# Patient Record
Sex: MALE | Race: BLACK OR AFRICAN AMERICAN
[De-identification: names, ages, dates, MRNs, and addresses within clinical notes are randomized per-mention and may not be internally consistent; named-entity substitution may affect disease eponyms.]

---

## 2020-08-01 ENCOUNTER — HOSPITAL ENCOUNTER (EMERGENCY)
Dept: HOSPITAL 62 - ER | Age: 25
Discharge: HOME | End: 2020-08-01
Payer: OTHER GOVERNMENT

## 2020-08-01 VITALS — SYSTOLIC BLOOD PRESSURE: 141 MMHG | DIASTOLIC BLOOD PRESSURE: 81 MMHG

## 2020-08-01 DIAGNOSIS — R11.0: ICD-10-CM

## 2020-08-01 DIAGNOSIS — N30.91: Primary | ICD-10-CM

## 2020-08-01 DIAGNOSIS — Z87.442: ICD-10-CM

## 2020-08-01 DIAGNOSIS — M54.9: ICD-10-CM

## 2020-08-01 DIAGNOSIS — R10.9: ICD-10-CM

## 2020-08-01 LAB
ADD MANUAL DIFF: NO
ALBUMIN SERPL-MCNC: 4.7 G/DL (ref 3.5–5)
ALP SERPL-CCNC: 55 U/L (ref 38–126)
ANION GAP SERPL CALC-SCNC: 6 MMOL/L (ref 5–19)
APPEARANCE UR: (no result)
APTT PPP: YELLOW S
AST SERPL-CCNC: 30 U/L (ref 17–59)
BASOPHILS # BLD AUTO: 0.1 10^3/UL (ref 0–0.2)
BASOPHILS NFR BLD AUTO: 1.4 % (ref 0–2)
BILIRUB DIRECT SERPL-MCNC: 0.1 MG/DL (ref 0–0.4)
BILIRUB SERPL-MCNC: 0.5 MG/DL (ref 0.2–1.3)
BILIRUB UR QL STRIP: NEGATIVE
BUN SERPL-MCNC: 13 MG/DL (ref 7–20)
CALCIUM: 9.9 MG/DL (ref 8.4–10.2)
CHLORIDE SERPL-SCNC: 107 MMOL/L (ref 98–107)
CO2 SERPL-SCNC: 27 MMOL/L (ref 22–30)
EOSINOPHIL # BLD AUTO: 0.3 10^3/UL (ref 0–0.6)
EOSINOPHIL NFR BLD AUTO: 4.9 % (ref 0–6)
ERYTHROCYTE [DISTWIDTH] IN BLOOD BY AUTOMATED COUNT: 13.5 % (ref 11.5–14)
GLUCOSE SERPL-MCNC: 89 MG/DL (ref 75–110)
GLUCOSE UR STRIP-MCNC: NEGATIVE MG/DL
HCT VFR BLD CALC: 41.5 % (ref 37.9–51)
HGB BLD-MCNC: 14 G/DL (ref 13.5–17)
KETONES UR STRIP-MCNC: NEGATIVE MG/DL
LYMPHOCYTES # BLD AUTO: 3.1 10^3/UL (ref 0.5–4.7)
LYMPHOCYTES NFR BLD AUTO: 45.4 % (ref 13–45)
MCH RBC QN AUTO: 29.3 PG (ref 27–33.4)
MCHC RBC AUTO-ENTMCNC: 33.7 G/DL (ref 32–36)
MCV RBC AUTO: 87 FL (ref 80–97)
MONOCYTES # BLD AUTO: 0.5 10^3/UL (ref 0.1–1.4)
MONOCYTES NFR BLD AUTO: 6.9 % (ref 3–13)
NEUTROPHILS # BLD AUTO: 2.8 10^3/UL (ref 1.7–8.2)
NEUTS SEG NFR BLD AUTO: 41.4 % (ref 42–78)
NITRITE UR QL STRIP: NEGATIVE
PH UR STRIP: 5 [PH] (ref 5–9)
PLATELET # BLD: 287 10^3/UL (ref 150–450)
POTASSIUM SERPL-SCNC: 4.5 MMOL/L (ref 3.6–5)
PROT SERPL-MCNC: 8.2 G/DL (ref 6.3–8.2)
PROT UR STRIP-MCNC: NEGATIVE MG/DL
RBC # BLD AUTO: 4.77 10^6/UL (ref 4.35–5.55)
SP GR UR STRIP: 1.01
TOTAL CELLS COUNTED % (AUTO): 100 %
UROBILINOGEN UR-MCNC: NEGATIVE MG/DL (ref ?–2)
WBC # BLD AUTO: 6.9 10^3/UL (ref 4–10.5)

## 2020-08-01 PROCEDURE — 85025 COMPLETE CBC W/AUTO DIFF WBC: CPT

## 2020-08-01 PROCEDURE — 96372 THER/PROPH/DIAG INJ SC/IM: CPT

## 2020-08-01 PROCEDURE — 36415 COLL VENOUS BLD VENIPUNCTURE: CPT

## 2020-08-01 PROCEDURE — 83690 ASSAY OF LIPASE: CPT

## 2020-08-01 PROCEDURE — 74176 CT ABD & PELVIS W/O CONTRAST: CPT

## 2020-08-01 PROCEDURE — 80053 COMPREHEN METABOLIC PANEL: CPT

## 2020-08-01 PROCEDURE — 81001 URINALYSIS AUTO W/SCOPE: CPT

## 2020-08-01 PROCEDURE — 99284 EMERGENCY DEPT VISIT MOD MDM: CPT

## 2020-08-01 NOTE — ER DOCUMENT REPORT
ED GI/





- General


Chief Complaint: Flank Pain


Stated Complaint: FLANK PAIN


Time Seen by Provider: 20 16:49


Primary Care Provider: 


LARRY GALVAN [NO LOCAL MD] - Follow up as needed


Notes: 





24-year-old male presents to the emergency department with a history of right 

flank pain.  He states that he has had these symptoms intermittently for the 

past 2 weeks.  Patient states that he was seen at [facility] 1 week ago and x-

ray was done and because of blood in his urine and the pain in his right side 

and back he was told he had a kidney stone.  He is continued to have dysuria, 

blood in his urine and intermittent right-sided back pain.  At times the pain is

severe and throbbing he also notes that he has had some associated nausea.  

Presently he is doing well and states that he does not need anything for pain.





- Related Data


Allergies/Adverse Reactions: 


                                        





No Known Allergies Allergy (Verified 20 16:49)


   











Past Medical History





- Social History


Smoking Status: Never Smoker


Family History: Reviewed & Not Pertinent


Patient has homicidal ideation: No


Psychiatric Medical History: Reports: Hx Anxiety, Hx Depression





- Immunizations


Immunizations up to date: Yes


Hx Diphtheria, Pertussis, Tetanus Vaccination: Yes





Review of Systems





- Review of Systems


Notes: 





Constitutional: Negative for fever.


HENT: Negative for sore throat.


Eyes: Negative for visual changes.


Cardiovascular: Negative for chest pain.


Respiratory: Negative for shortness of breath.


Gastrointestinal: Negative for abdominal pain, vomiting or diarrhea.


Genitourinary: + Hematuria, + Dysuria 


Musculoskeletal: + Right flank pain, + back pain


Skin: Negative for rash.


Neurological: Negative for headaches, weakness or numbness.





10 point ROS negative except as marked above and in HPI.





Physical Exam





- Vital signs


Vitals: 


                                        











Temp Pulse Resp BP Pulse Ox


 


 98.9 F   71   18   141/81 H  98 


 


 20 15:19  20 15:19  20 15:19  20 15:19  20 15:19














- Notes


Notes: 





PHYSICAL EXAMINATION:


 


Physical Exam:


General: Well-nourished well-developed 24-year-old male in no acute distress


HEENT: NC/AT, pupils equal round and reactive to light, MM moist,nares clear, 

oropharynx clear, airway patent


Neck: supple, no adenopathy, no masses.  Good range of motion


Lungs: clear, no wheezing, no rales no rhonchi


CVS: Regular rate and rhythm no murmur gallop or rub


Abdomen: Soft, active, nontender, no masses, no hepatosplenomegaly


Ext:   + Tenderness in the right paraspinous muscle group, decreased range of 

motion in the back secondary to tenderness.


Neuro: Alert and responsive, moving all 4 extremities on command, cranial nerves

intact, no focal findings


Skin: Intact no open lesions, no rash


PSYCH: Normal mood, normal affect.


 








Course





- Re-evaluation


Re-evalutation: 





20 19:32


Patient is comfortable at this time, denies severe back pain, no nausea and no 

fever.  Review of his labs reveals a normal white count urine reveals greater 

than 182 WBCs with large leukocyte esterase.  I have explained to the patient he

likely has a urinary tract infection with hematuria dysuria.  Hemorrhagic 

cystitis is usually amenable to antibiotic treatment.  He is given Rocephin 1 g 

IM, a prescription for Keflex and ibuprofen.  I encouraged him to continue push 

fluids, increasing his fluid intake and to follow-up with his primary care do

ctor as needed.  Patient is in agreement with this plan.











- Vital Signs


Vital signs: 


                                        











Temp Pulse Resp BP Pulse Ox


 


 98.9 F   71   18   141/81 H  98 


 


 20 15:19  20 15:19  20 15:19  20 15:19  20 15:19














- Laboratory


Result Diagrams: 


                                 20 17:12





                                 20 17:12


Laboratory results interpreted by me: 


                                        











  20





  17:12 17:12 17:12


 


Lymph % (Auto)  45.4 H  


 


Seg Neutrophils %  41.4 L  


 


ALT   52 H 


 


Urine Blood    SMALL H


 


Ur Leukocyte Esterase    LARGE H











20 19:34


I have reviewed laboratory data and used this information for the treatment 

decisions regarding the patient.





- Diagnostic Test


Radiology reviewed: Image reviewed, Reports reviewed


Radiology results interpreted by me: 





20 19:37


CT abdomen and pelvis without contrast: No hydronephrosis or hydroureter, no 

kidney stone found.  Normal study.











Discharge





- Discharge


Clinical Impression: 


 Hemorrhagic cystitis, Back pain





Condition: Good


Disposition: HOME, SELF-CARE


Instructions:  Urinary Tract Infection ([facility])


Additional Instructions: 


You were seen in the emergency department today with concerns of a kidney stone.

 The CT scan performed today does not reveal a kidney stone or residual findings

suggestive of a passed kidney stone.  The urine however is infected and shows 

signs of possible hemorrhagic cystitis.  You are being given a shot of 

antibiotics in emergency department and a prescription to get field for 

antibiotics and ibuprofen.  If your symptoms are worsening, if you run fever, or

if you feel like you are getting worse you may return to the emergency 

department for further evaluation and treatment.








HOME CARE INSTRUCTIONS & INFORMATION:  Thank you for choosing us for your 

medical needs. We hope you're satisfied with the care you received.  After you 

leave, you must properly care for your problem and, at the same time, observe 

its progress.  Any condition can change.  Some illnesses can change rapidly over

hours or days.  If your condition worsens, return to the Emergency Department or

see your physician promptly.





ABOUT YOUR X-RAYS AND EKG'S:   If you had an EKG or X-rays taken, they have been

read by the Emergency Physician. The X-rays and EKG's will also be read by a 

Radiologist or Cardiologist within 24 hours.  If discrepancies are noted, you 

will be notified by telephone.  Please be certain the ED has a correct telephone

number & address where you can be reached.  Also, realize that some fractures or

abnormalities do not show up on initial X-rays.  If your symptoms continue, see 

your physician.





ABOUT YOUR LABORATORY TEST:   If you had laboratory tests, the results have been

reviewed by the Emergency Physician.  Some test results (for example cultures) 

may not be available for several days.  You will be contacted if any test result

shows you need additional treatment.  Please be certain the ED has a correct 

telephone number and address where you can be reached.





ABOUT YOUR MEDICATIONS:  You will receive instructions on how to take your 

medicine on the prescription label you receive.  Additional information may be 

provided by the Pharmacy.  If you have questions afterwards, call the ED for 

clarification or further instructions.  Some prescribed medications may cause 

drowsiness.  Do not perform tasks such as driving a car or operating machinery 

without consulting your Pharmacist.  If you feel you need a refill of pain 

medication, your condition will need re-evaluation.  Please do not call for a 

refill of any medication.





ABOUT YOUR SIGNATURE:   Signature of this document acknowledges to followin. Understanding that you received emergency treatment and that you may be 

released before al medical problems are known or treated. Please be certain   

the ED has a correct phone number & address where you can be reached.


   2. Acknowledgement that you will arrange for follow-up care as recommended.


   3. Authorization for the Emergency Physician to provide information to your 

follow-up Physician in order to maximize your care.





AT ANY TIME, IF YOUR SYMPTOMS CHANGE SIGNIFICANTLY OR WORSEN OR YOU DEVELOP NEW 

SYMPTOMS, RETURN TO THE EMERGENCY DEPARTMENT IMMEDIATELY FOR RE-EVALUATION.





OUR GOAL IS TO PROVIDE EXCELLENT MEDICAL CARE!





WE HOPE THAT WE HAVE MET YOUR EXPECTATIONS DURING YOUR EMERGENCY DEPARTMENT 

VISIT AND THAT YOU FEEL YOU HAVE RECEIVED EXCELLENT CARE!











Prescriptions: 


Cephalexin Monohydrate [Keflex 500 mg Capsule] 500 mg PO Q8 10 Days #30 capsule


Ibuprofen [Motrin 800 mg Tablet] 800 mg PO Q8H PRN #30 tab


 PRN Reason: 


Referrals: 


[facility] [NO LOCAL MD] - Follow up as needed

## 2020-08-01 NOTE — ER DOCUMENT REPORT
ED Medical Screen (RME)





- General


Stated Complaint: FLANK PAIN


Time Seen by Provider: 08/01/20 16:49


Primary Care Provider: 


LARRY GALVAN [Primary Care Provider] - Follow up as needed


Notes: 





HPI: 24-year-old male with no history of kidney stones presenting for 

intermittent right flank pain with hematuria for a week.  Went to [facility] 1

week ago and based on a urinalysis they determined he might have a kidney stone.

 Placed him on Flomax and hydrocodone.  Patient states he is continued with the 

pain follow back up with him today and they told him to come to the emergency 

department because he might need CT.  Patient denies fever nausea vomiting.





PHYSICAL EXAMINATION: No CVA tenderness or tenderness through the right upper or

lower quadrants on palpation.   exam deferred in triage











I have greeted and performed a rapid initial assessment of this patient.  A 

comprehensive ED assessment and evaluation of the patient, analysis of test 

results and completion of medical decision making process will be conducted by 

an additional ED providers.





- Related Data


Allergies/Adverse Reactions: 


                                        





No Known Allergies Allergy (Verified 08/01/20 16:49)


   











Past Medical History


Psychiatric Medical History: Reports: Hx Anxiety, Hx Depression





- Immunizations


Immunizations up to date: Yes


Hx Diphtheria, Pertussis, Tetanus Vaccination: Yes





Physical Exam





- Vital signs


Vitals: 





                                        











Temp Pulse Resp BP Pulse Ox


 


 98.9 F   71   18   141/81 H  98 


 


 08/01/20 15:19  08/01/20 15:19  08/01/20 15:19  08/01/20 15:19  08/01/20 15:19














Course





- Vital Signs


Vital signs: 





                                        











Temp Pulse Resp BP Pulse Ox


 


 98.9 F   71   18   141/81 H  98 


 


 08/01/20 15:19  08/01/20 15:19  08/01/20 15:19  08/01/20 15:19  08/01/20 15:19














Doctor's Discharge





- Discharge


Referrals: 


[facility] [Primary Care Provider] - Follow up as needed

## 2020-08-01 NOTE — RADIOLOGY REPORT (SQ)
EXAM DESCRIPTION:  CT ABD/PELVIS NO ORAL OR IV



IMAGES COMPLETED DATE/TIME:  8/1/2020 6:24 pm



REASON FOR STUDY:  right flank pain



COMPARISON:  None.



TECHNIQUE:  CT scan of the abdomen and pelvis performed without intravenous or oral contrast. Images 
reviewed with lung, soft tissue, and bone windows. Reconstructed coronal and sagittal MPR images revi
ewed. All images stored on PACS.

All CT scanners at this facility use dose modulation, iterative reconstruction, and/or weight based d
osing when appropriate to reduce radiation dose to as low as reasonably achievable (ALARA).

[facility]: Dose Right  [facility]: CareDose    [facility]: Dose Right    [facility]: Teradose 4D    [facility]: Smart MATRIXX Software



RADIATION DOSE:  CT Rad equipment meets quality standard of care and radiation dose reduction techniq
ues were employed. CTDIvol: 17.8 mGy. DLP: 1002 mGy-cm.mGy.



LIMITATIONS:  None.



FINDINGS:  LOWER CHEST: No significant findings. No nodules or infiltrates.

NON-CONTRASTED LIVER, SPLEEN, ADRENALS: Evaluation limited by lack of IV contrast. No identified sign
ificant masses.

PANCREAS: No masses. No peripancreatic inflammatory changes.

GALLBLADDER: No calcified stones. No inflammatory changes to suggest cholecystitis.

RIGHT KIDNEY AND URETER: No cysts identified. No solid masses. No calcified stones. No hydronephrosis
 or hydroureter.

LEFT KIDNEY AND URETER: No cysts identified. No solid masses. No calcified stones. No hydronephrosis 
or hydroureter.

AORTA AND RETROPERITONEUM: No aneurysm. No retroperitoneal masses or adenopathy.

BOWEL AND PERITONEAL CAVITY: No obvious masses or inflammatory changes. No free fluid.

APPENDIX: Normal.

PELVIS, BLADDER, AND ABDOMINAL WALL:No abnormal masses. No free fluid. Unremarkable bladder.

BONES: No acute findings.

OTHER: No other significant finding.



IMPRESSION:  NO ACUTE FINDINGS.



TECHNICAL DOCUMENTATION:  JOB ID:  [id number]

[id number]

Quality ID # [id number]: Final reports with documentation of one or more dose reduction techniques (e.g., Au
tomated exposure control, adjustment of the mA and/or kV according to patient size, use of iterative 
reconstruction technique)

 2011 [employer]- All Rights Reserved



Reading location - IP/workstation name: [email or web address]